# Patient Record
Sex: MALE | Race: BLACK OR AFRICAN AMERICAN | NOT HISPANIC OR LATINO | Employment: STUDENT | URBAN - METROPOLITAN AREA
[De-identification: names, ages, dates, MRNs, and addresses within clinical notes are randomized per-mention and may not be internally consistent; named-entity substitution may affect disease eponyms.]

---

## 2017-07-30 ENCOUNTER — HOSPITAL ENCOUNTER (EMERGENCY)
Facility: HOSPITAL | Age: 22
Discharge: HOME | End: 2017-07-30

## 2017-07-30 VITALS
OXYGEN SATURATION: 99 % | TEMPERATURE: 98 F | DIASTOLIC BLOOD PRESSURE: 97 MMHG | HEART RATE: 59 BPM | SYSTOLIC BLOOD PRESSURE: 141 MMHG | RESPIRATION RATE: 18 BRPM | HEIGHT: 75 IN | WEIGHT: 175 LBS | BODY MASS INDEX: 21.76 KG/M2

## 2017-07-30 DIAGNOSIS — S61.011A: Primary | ICD-10-CM

## 2017-07-30 DIAGNOSIS — S61.011A: ICD-10-CM
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
## 2017-07-30 DIAGNOSIS — Z23 NEED FOR TETANUS BOOSTER: ICD-10-CM

## 2017-07-30 PROCEDURE — 63600175 PHARM REV CODE 636 W HCPCS: Performed by: PHYSICIAN ASSISTANT

## 2017-07-30 PROCEDURE — 25000003 PHARM REV CODE 250: Performed by: PHYSICIAN ASSISTANT

## 2017-07-30 PROCEDURE — 90471 IMMUNIZATION ADMIN: CPT | Performed by: PHYSICIAN ASSISTANT

## 2017-07-30 PROCEDURE — 99283 EMERGENCY DEPT VISIT LOW MDM: CPT | Mod: 25

## 2017-07-30 PROCEDURE — 90715 TDAP VACCINE 7 YRS/> IM: CPT | Performed by: PHYSICIAN ASSISTANT

## 2017-07-30 PROCEDURE — S0020 INJECTION, BUPIVICAINE HYDRO: HCPCS | Performed by: PHYSICIAN ASSISTANT

## 2017-07-30 PROCEDURE — 13121 CMPLX RPR S/A/L 2.6-7.5 CM: CPT

## 2017-07-30 PROCEDURE — 12042 INTMD RPR N-HF/GENIT2.6-7.5: CPT

## 2017-07-30 RX ORDER — HYDROCODONE BITARTRATE AND ACETAMINOPHEN 10; 325 MG/1; MG/1
1 TABLET ORAL
Status: COMPLETED | OUTPATIENT
Start: 2017-07-30 | End: 2017-07-30

## 2017-07-30 RX ORDER — SULFAMETHOXAZOLE AND TRIMETHOPRIM 800; 160 MG/1; MG/1
1 TABLET ORAL EVERY 12 HOURS
Qty: 20 TABLET | Refills: 0 | Status: SHIPPED | OUTPATIENT
Start: 2017-07-30 | End: 2017-08-09

## 2017-07-30 RX ORDER — DICLOFENAC SODIUM 50 MG/1
50 TABLET, DELAYED RELEASE ORAL 3 TIMES DAILY PRN
Qty: 15 TABLET | Refills: 0 | Status: SHIPPED | OUTPATIENT
Start: 2017-07-30

## 2017-07-30 RX ORDER — BUPIVACAINE HYDROCHLORIDE 5 MG/ML
10 INJECTION, SOLUTION EPIDURAL; INTRACAUDAL
Status: COMPLETED | OUTPATIENT
Start: 2017-07-30 | End: 2017-07-30

## 2017-07-30 RX ADMIN — BUPIVACAINE HYDROCHLORIDE 50 MG: 5 INJECTION, SOLUTION EPIDURAL; INTRACAUDAL; PERINEURAL at 08:07

## 2017-07-30 RX ADMIN — CLOSTRIDIUM TETANI TOXOID ANTIGEN (FORMALDEHYDE INACTIVATED), CORYNEBACTERIUM DIPHTHERIAE TOXOID ANTIGEN (FORMALDEHYDE INACTIVATED), BORDETELLA PERTUSSIS TOXOID ANTIGEN (GLUTARALDEHYDE INACTIVATED), BORDETELLA PERTUSSIS FILAMENTOUS HEMAGGLUTININ ANTIGEN (FORMALDEHYDE INACTIVATED), BORDETELLA PERTUSSIS PERTACTIN ANTIGEN, AND BORDETELLA PERTUSSIS FIMBRIAE 2/3 ANTIGEN 0.5 ML: 5; 2; 2.5; 5; 3; 5 INJECTION, SUSPENSION INTRAMUSCULAR at 08:07

## 2017-07-30 RX ADMIN — HYDROCODONE BITARTRATE AND ACETAMINOPHEN 1 TABLET: 10; 325 TABLET ORAL at 08:07

## 2017-07-30 RX ADMIN — BACITRACIN, NEOMYCIN, POLYMYXIN B 1 EACH: 400; 3.5; 5 OINTMENT TOPICAL at 08:07

## 2017-07-31 NOTE — ED PROVIDER NOTES
SCRIBE #1 NOTE: I, Dillon Harmon, am scribing for, and in the presence of, Berny Ibrahim PA-C. I have scribed the entire note.      History      Chief Complaint   Patient presents with    Laceration     to R thumb with a saw       Review of patient's allergies indicates:  No Known Allergies     HPI   HPI    7/30/2017, 7:51 PM   History obtained from the patient      History of Present Illness: Rolf Porter is a 21 y.o. male patient who presents to the Emergency Department for an acute laceration to his R thumb which onset suddenly while using a radial saw to remodel his home just prior to arrival. Symptoms are constant and moderate in severity. Pt states that he thought the saw was not plugged when it cut him and denies UTD tetanus. He is right handed. There are no associated sxs at this time. Patient denies any extremity weakness/numbness/paresthesias, other injury, possibility of foreign body, and all other sxs at this time. No further complaints or concerns at this time.       Arrival mode: Personal vehicle      PCP: Primary Doctor No       Past Medical History:  History reviewed. No pertinent past medical history.    Past Surgical History:  History reviewed. No pertinent surgical history.      Family History:  No family history on file.    Social History:  Social History     Social History Main Topics    Smoking status: Never Smoker    Smokeless tobacco: Never Used    Alcohol use No    Drug use: No    Sexual activity: Not on file       ROS   Review of Systems   Constitutional: Negative for diaphoresis.        (-) other injury, possible foreign body   Eyes: Negative for pain and visual disturbance.   Respiratory: Negative for chest tightness and shortness of breath.    Cardiovascular: Negative for chest pain.   Gastrointestinal: Negative for nausea and vomiting.   Musculoskeletal: Negative for arthralgias and joint swelling.   Skin: Positive for wound.   Neurological: Negative for  dizziness, seizures, syncope, weakness, light-headedness, numbness and headaches.        (-) paresthesia   Psychiatric/Behavioral: Negative for confusion. The patient is nervous/anxious.    All other systems reviewed and are negative.      Physical Exam      Initial Vitals [07/30/17 1943]   BP Pulse Resp Temp SpO2   (!) 145/83 84 20 97.9 °F (36.6 °C) 100 %      MAP       103.67          Physical Exam   Musculoskeletal:        Hands:    Nursing Notes and Vital Signs Reviewed.  Constitutional: Patient is in moderate distress. Well-developed and well-nourished.  Head: Atraumatic. Normocephalic.  Eyes: Atraumatic.   ENT: Mucous membranes are moist.   Cardiovascular: Regular rate. Regular rhythm. Normal cap refill to injured digit.   Pulmonary/Chest: No respiratory distress.   Abdominal: Soft and non-distended.    Musculoskeletal: Right thumb with an acute complex jagged, full thickness laceration over anterior aspect of R thumb, measures approximately 4 cm in length; NV distal to wound; no FB seen, no tendon injury appreciated. Normal flexion and extension. Normal ROM.   Skin: Warm and dry.  Neurological:  Alert, awake, and appropriate. Normal speech. No acute focal neurological deficits are appreciated.  Psychiatric: Normal affect. Good eye contact. Appropriate in content.    ED Course    Lac Repair  Date/Time: 7/30/2017 8:17 PM  Performed by: SHADY CHAN.  Authorized by: RAE ALEXIS JR   Body area: upper extremity  Location details: right thumb  Laceration length: 4 cm  Foreign bodies: no foreign bodies  Anesthesia: digital block    Anesthesia:  Local Anesthetic: bupivacaine 0.5% without epinephrine  Patient sedated: no  Preparation: Patient was prepped and draped in the usual sterile fashion.  Irrigation solution: saline  Irrigation method: syringe  Amount of cleaning: extensive  Skin closure: Ethilon and 3-0 nylon  Number of sutures: 7 (2 dorsally, 5 ventrally)  Technique: simple  Approximation:  "close  Approximation difficulty: complex  Dressing: 4x4 sterile gauze, splint, antibiotic ointment and gauze roll  Patient tolerance: Patient tolerated the procedure well with no immediate complications    Splint Application  Date/Time: 7/30/2017 8:55 PM  Performed by: SHADY CHAN  Authorized by: RAE ALEXIS JR   Location details: right thumb  Splint type: static finger  Supplies used: aluminum splint  Post-procedure: The splinted body part was neurovascularly unchanged following the procedure.  Patient tolerance: Patient tolerated the procedure well with no immediate complications        ED Vital Signs:  Vitals:    07/30/17 1943 07/30/17 2057   BP: (!) 145/83 (!) 141/97   Pulse: 84 (!) 59   Resp: 20 18   Temp: 97.9 °F (36.6 °C)    TempSrc: Oral    SpO2: 100% 99%   Weight: 79.4 kg (175 lb)    Height: 6' 3" (1.905 m)        Imaging Results:  Imaging Results          X-Ray Finger 2 or More Views (Final result)  Result time 07/30/17 20:12:15    Final result by Kevan Dobbins III, MD (07/30/17 20:12:15)                 Impression:     See above.         Electronically signed by: KEVAN DOBBINS MD  Date:     07/30/17  Time:    20:12              Narrative:    XR FINGER 2 OR MORE VIEWS    Clinical history: S61.011A Laceration without foreign body of right thumb without damage to nail, initial encounter    Findings: Bandage overlying the thumb laceration limits evaluation for adjacent non-metallic foreign bodies.  No metallic density foreign bodies are identified. 5 mm linear hyperdensity within the soft tissues volar radial to the thumb proximal phalanx base is proximal to the laceration and likely external to the patient.  No other definitive evidence of foreign body.  There is a punctate age indeterminate avulsion fracture of the thumb distal phalanx dorsal base.  No other fracture identified.  Joint alignment is anatomic. The joint spaces appear well maintained.                                      The " Emergency Provider reviewed the vital signs and test results, which are outlined above.    ED Discussion     8:56 PM: Reassessed pt at this time.  Pt is resting comfortably in ED bed, in NAD, and VSS at this time. Discussed with pt all pertinent ED information and results. Discussed pt dx and plan of tx. Gave pt all f/u and return to the ED instructions. All questions and concerns were addressed at this time. Pt expresses understanding of information and instructions, and is comfortable with plan to discharge. Pt is stable for discharge.    I discussed wound care precautions with patient and/or family/caretaker; specifically that all wounds have risk of infection despite efforts to cleanse and debride the wound; and there is a risk of an occult foreign body (and thus increased risk of infection) despite a negative examination.  I discussed with patient need to return for any signs of infection, specifically redness, increased pain, fever, drainage of pus, or any concern, immediately.    I discussed with patient and/or family/caretaker that negative X-ray does not rule out occult fracture or other soft tissue injury.  Persistent pain greater than 7-10 days or increased pain requires follow up, specifically with orthopedics.     I discussed with patient and/or family/caretaker that evaluation in the ED does not suggest any emergent or life threatening medical conditions requiring immediate intervention beyond what was provided in the ED, and I believe patient is safe for discharge.  Regardless, an unremarkable evaluation in the ED does not preclude the development or presence of a serious of life threatening condition. As such, patient was instructed to return immediately for any worsening or change in current symptoms.      ED Medication(s):  Medications   neomycin-bacitracnZn-polymyxnB packet (1 each Topical (Top) Given 7/30/17 2011)   bupivacaine (PF) 0.5% (5 mg/ml) injection 50 mg (50 mg Subcutaneous Given 7/30/17  2009)   Tdap vaccine injection 0.5 mL (0.5 mLs Intramuscular Given 7/30/17 2011)   hydrocodone-acetaminophen 10-325mg per tablet 1 tablet (1 tablet Oral Given 7/30/17 2010)       Discharge Medication List as of 7/30/2017  8:55 PM      START taking these medications    Details   diclofenac (VOLTAREN) 50 MG EC tablet Take 1 tablet (50 mg total) by mouth 3 (three) times daily as needed (PAIN)., Starting Sun 7/30/2017, Print      sulfamethoxazole-trimethoprim 800-160mg (BACTRIM DS) 800-160 mg Tab Take 1 tablet by mouth every 12 (twelve) hours., Starting Sun 7/30/2017, Until Wed 8/9/2017, Print             Follow-up Information     [facility] - Orthopedics. Schedule an appointment as soon as possible for a visit in 2 days.    Specialty:  Orthopedics  Why:  For wound re-check. Keep wound clean and dry. Change dressing daily. Sutures will need to be removed in 12-14 days.  Contact information:  [address] [facility] [address] [phone]  Additional information:  (off [facility]) 2nd floor           [facility] - .    Specialty:  Emergency Medicine  Why:  If symptoms worsen in any way.  Contact information:  [address] [address] [phone]                   Medical Decision Making    Medical Decision Making:   Clinical Tests:   Radiological Study: Ordered and Reviewed     Additional MDM:   X-Rays: I have independently interpreted X-Ray(s) - see notes.        Scribe Attestation:   Scribe #1: I performed the above scribed service and the documentation accurately describes the services I performed. I attest to the accuracy of the note.    Attending:   Physician Attestation Statement for Scribe #1: I, Berny Ibrahim PA-C, personally performed the services described in this documentation, as scribed by Dillon Harmon, in my presence, and it is both accurate and complete.          Clinical Impression       ICD-10-CM ICD-9-CM   1. Laceration of  thumb, right, complicated, initial encounter S61.011A 883.1   2. Laceration of thumb, right S61.011A 883.0   3. Contact with powered saw as cause of accidental injury W29.3XXA E920.1   4. Need for tetanus booster Z23 V03.7       Disposition:   Disposition: Discharged  Condition: Stable         Berny Ibrahim PA-C  07/30/17 2109